# Patient Record
Sex: MALE | URBAN - METROPOLITAN AREA
[De-identification: names, ages, dates, MRNs, and addresses within clinical notes are randomized per-mention and may not be internally consistent; named-entity substitution may affect disease eponyms.]

---

## 2024-10-16 ENCOUNTER — TELEPHONE (OUTPATIENT)
Age: 44
End: 2024-10-16

## 2024-10-16 NOTE — TELEPHONE ENCOUNTER
New Patient    What is the reason for the patient’s appointment?:Patient is having b/l testicular pain for months. Large amount of blood in semen. She will check if patient had any testing done.     Brenda from [facility] called to set up appointment for patient.  Patient is an inmate.     She will fax any records for review.     Patient scheduled appt for 11/20/24 at 230 pm with Dr. D;'[place]    What office location does the patient prefer?:[place] office.     Does patient have Imaging/Lab Results:    Have patient records been requested?:  If No, are the records showing in Epic:       INSURANCE:   Do we accept the patient's insurance or is the patient Self-Pay?:    Insurance Provider:[employer]   Plan Type/Number:   Member ID#: [id number]      HISTORY:   Has the patient had any previous Urologist(s)?:no     Was the patient seen in the ED?:    Has the patient had any outside testing done?:  No   Does the patient have a personal history of cancer?:no